# Patient Record
Sex: MALE | Race: WHITE | NOT HISPANIC OR LATINO | URBAN - METROPOLITAN AREA
[De-identification: names, ages, dates, MRNs, and addresses within clinical notes are randomized per-mention and may not be internally consistent; named-entity substitution may affect disease eponyms.]

---
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
## 2022-02-11 VITALS
DIASTOLIC BLOOD PRESSURE: 66 MMHG | WEIGHT: 306.8 LBS | WEIGHT: 315 LBS | HEIGHT: 72 IN | SYSTOLIC BLOOD PRESSURE: 122 MMHG | BODY MASS INDEX: 41.55 KG/M2 | HEIGHT: 72 IN | DIASTOLIC BLOOD PRESSURE: 60 MMHG | HEART RATE: 64 BPM | SYSTOLIC BLOOD PRESSURE: 124 MMHG | HEART RATE: 64 BPM | BODY MASS INDEX: 42.66 KG/M2 | TEMPERATURE: 97.6 F

## 2022-02-16 NOTE — PROGRESS NOTES
Patient:   DONNA CORTÉS            MRN: [id number]            FIN: [id number]               Age:   17 years     Sex:  Male     :  2000   Associated Diagnoses:   Moderate major depression   Author:   Darnell Sawant MD      Visit Information      Date of Service: 2018 03:35 pm  Performing Location: [facility]  Encounter#: [id number]      Primary Care Provider (PCP):  Darnell Sawant MD    NPI# [id number]      Referring Provider:  Darnell Sawant MD    NPI# [id number]      Chief Complaint   3/14/2018 3:40 PM CDT    Discuss depression/anxiety   Chief complaint and symptoms noted above confirmed with patient.      Interval History   Depression   The course is worsening.  Compliance problems: with exercise program.  The effect on daily activities is change in activity level, no change in eating habits and no change in sleeping patterns.  Associated symptoms characterized by loss of appetite, no suicidal thoughts.        Review of Systems   Constitutional:  Negative.    Respiratory:  Negative.    Cardiovascular:  Negative.    Gastrointestinal:  Negative.    Neurologic:  Negative.    Psychiatric:  Negative except as documented in history of present illness.       Health Status   Allergies:    Allergic Reactions (Selected)  No Known Medication Allergies   Medications:  (Selected)   Prescriptions  Prescribed  Wellbutrin  mg/12 hours oral tablet, extended release: See Instructions, Instructions: 1 tab(s) PO QD x 3 then BID, # 25 tab(s), 0 Refill(s), Type: Maintenance, Pharmacy: [facility] #[id number], 1 tab(s) PO QD x 3 then BID   Problem list:    All Problems  Morbid obesity / SNOMED CT 563903359 / Probable      Histories   Past Medical History:    No active or resolved past medical history items have been selected or recorded.   Family History:    No family history items have been selected or recorded.   Procedure history:    No active procedure history items  have been selected or recorded.   Social History:             No active social history items have been recorded.      Physical Examination   Vital Signs   3/14/2018 3:40 PM CDT Temperature Tympanic 97.6 DegF  LOW    Peripheral Pulse Rate 64 bpm    Pulse Site Radial artery    HR Method Manual    Systolic Blood Pressure 124 mmHg    Diastolic Blood Pressure 66 mmHg    Mean Arterial Pressure 85 mmHg    BP Site Left arm    BP Method Manual      Measurements from flowsheet : Measurements   3/14/2018 3:40 PM CDT Height Measured - Standard 71.75 in    Weight Measured - Standard 319.4 lb    BSA 2.71 m2    Body Mass Index 43.62 kg/m2    Body Mass Index Percentile 99.82      General:  Alert and oriented, No acute distress.    Eye:  Normal conjunctiva.    HENT:  Normocephalic.    Neck:  Supple, Non-tender.    Respiratory:  Lungs are clear to auscultation.    Cardiovascular:  Normal rate, Regular rhythm, No murmur.    Gastrointestinal:  Soft, Non-tender, Non-distended.    Genitourinary:  No costovertebral angle tenderness.    Musculoskeletal:  Normal range of motion, Normal strength, No tenderness.    Integumentary:  Warm, Dry, Pink.    Neurologic:  Alert, Oriented, Normal sensory.    Psychiatric:  Cooperative, Appropriate mood & affect, Normal judgment, Non-suicidal.         Mood and affect: Depressed.         Behavior: Congruent.       Impression and Plan   Diagnosis     Moderate major depression (PXB40-NH F32.1).     Course:  Not improving.    Orders     Orders   Requests (Return to Office):  Return to Clinic (Request) (Order): Return in 1 week.     Orders (Selected)   Prescriptions  Prescribed  Wellbutrin  mg/12 hours oral tablet, extended release: See Instructions, Instructions: 1 tab(s) PO QD x 3 then BID, # 25 tab(s), 0 Refill(s), Type: Maintenance, Pharmacy: [email or web address] #[id number], 1 tab(s) PO QD x 3 then BID.     Reviewed risk of suicide with patient and father.  Also gave list of therapists to try as well..         Professional Services   Counseling Summary:  This was a 30 minute visit with greater than 50% of that time spent counseling the patient, and coordination of care..    Counseling included differential diagnoses, treatment options, risks and benefits, lifestyle changes, current condition, medications, and dose adjustments.    The patient was interactive, attentive, asked questions, and verbalized understanding.    Family present included father.

## 2022-02-16 NOTE — PROGRESS NOTES
Patient:   DONNA CORTÉS            MRN: [id number]            FIN: [id number]               Age:   17 years     Sex:  Male     :  2000   Associated Diagnoses:   Moderate major depression   Author:   Darnell Sawant MD      Visit Information      Date of Service: 2018 03:34 pm  Performing Location: [facility]  Encounter#: [id number]      Primary Care Provider (PCP):  Darnell Sawant MD    NPI# [id number]      Referring Provider:  Darnell Sawant MD    NPI# [id number]      Chief Complaint   2018 3:45 PM CDT    Follow up anxiety/depression     Chief complaint and symptoms noted above confirmed with patient.      Interval History   Depression   The course is worsening.  Compliance problems: with exercise program.  The effect on daily activities is change in activity level, no change in eating habits and no change in sleeping patterns.  Associated symptoms characterized by loss of appetite, no suicidal thoughts.        Review of Systems   Constitutional:  Negative.    Respiratory:  Negative.    Cardiovascular:  Negative.    Gastrointestinal:  Negative.    Neurologic:  Negative.    Psychiatric:  Negative except as documented in history of present illness.       Health Status   Allergies:    Allergic Reactions (Selected)  No Known Medication Allergies   Medications:  (Selected)   Prescriptions  Prescribed  Wellbutrin  mg/12 hours oral tablet, extended release: 1 tab(s) ( 150 mg ), po, bid, # 60 tab(s), 0 Refill(s), Type: Maintenance, Pharmacy: [facility] #[id number], 1 tab(s) po bid,x30 day(s)   Problem list:    All Problems  Morbid obesity / SNOMED CT 419930744 / Probable      Histories   Past Medical History:    No active or resolved past medical history items have been selected or recorded.   Family History:    No family history items have been selected or recorded.   Procedure history:    No active procedure history items have been selected or  recorded.   Social History:             No active social history items have been recorded.      Physical Examination   Vital Signs   4/27/2018 3:45 PM CDT Peripheral Pulse Rate 64 bpm    Pulse Site Radial artery    HR Method Manual    Systolic Blood Pressure 122 mmHg    Diastolic Blood Pressure 60 mmHg    Mean Arterial Pressure 81 mmHg    BP Site Left arm    BP Method Manual      Measurements from flowsheet : Measurements   4/27/2018 3:45 PM CDT Height Measured - Standard 72 in    Weight Measured - Standard 306.8 lb    BSA 2.66 m2    Body Mass Index 41.61 kg/m2    Body Mass Index Percentile 99.75      General:  Alert and oriented, No acute distress.    Eye:  Normal conjunctiva.    HENT:  Normocephalic.    Neck:  Supple, Non-tender.    Respiratory:  Lungs are clear to auscultation.    Cardiovascular:  Normal rate, Regular rhythm, No murmur.    Gastrointestinal:  Soft, Non-tender, Non-distended.    Genitourinary:  No costovertebral angle tenderness.    Musculoskeletal:  Normal range of motion, Normal strength, No tenderness.    Integumentary:  Warm, Dry, Pink.    Neurologic:  Alert, Oriented, Normal sensory.    Psychiatric:  Cooperative, Appropriate mood & affect, Normal judgment, Non-suicidal.         Mood and affect: Depressed.         Behavior: Congruent.       Impression and Plan   Diagnosis     Moderate major depression ([id number] F32.1).     Course:  Not improving.    Orders     Orders (Selected)   Prescriptions  Prescribed  Wellbutrin  mg/12 hours oral tablet, extended release: 1 tab(s) ( 150 mg ), po, bid, # 60 tab(s), 0 Refill(s), Type: Maintenance, Pharmacy: [facility] #[id number], 1 tab(s) po bid,x30 day(s).        Professional Services   Counseling Summary:     Counseling included differential diagnoses, treatment options, risks and benefits, lifestyle changes, current condition, medications, and dose adjustments.    The patient was interactive, attentive, asked questions, and verbalized understanding.     Family present included father.

## 2024-07-22 ENCOUNTER — ANCILLARY PROCEDURE (OUTPATIENT)
Dept: GENERAL RADIOLOGY | Facility: CLINIC | Age: 24
End: 2024-07-22
Attending: INTERNAL MEDICINE
Payer: COMMERCIAL

## 2024-07-22 ENCOUNTER — OFFICE VISIT (OUTPATIENT)
Dept: FAMILY MEDICINE | Facility: CLINIC | Age: 24
End: 2024-07-22
Payer: COMMERCIAL

## 2024-07-22 VITALS
OXYGEN SATURATION: 96 % | WEIGHT: 289 LBS | BODY MASS INDEX: 39.14 KG/M2 | TEMPERATURE: 98.3 F | HEIGHT: 72 IN | RESPIRATION RATE: 16 BRPM | SYSTOLIC BLOOD PRESSURE: 157 MMHG | DIASTOLIC BLOOD PRESSURE: 85 MMHG | HEART RATE: 66 BPM

## 2024-07-22 DIAGNOSIS — T14.90XA TRAUMA: ICD-10-CM

## 2024-07-22 DIAGNOSIS — R10.32 LLQ ABDOMINAL PAIN: ICD-10-CM

## 2024-07-22 DIAGNOSIS — R10.32 LLQ ABDOMINAL PAIN: Primary | ICD-10-CM

## 2024-07-22 DIAGNOSIS — R03.0 ELEVATED BLOOD PRESSURE READING: ICD-10-CM

## 2024-07-22 DIAGNOSIS — D64.9 MILD ANEMIA: Primary | ICD-10-CM

## 2024-07-22 LAB
ALBUMIN SERPL BCG-MCNC: 4.4 G/DL (ref 3.5–5.2)
ALBUMIN UR-MCNC: 30 MG/DL
ALP SERPL-CCNC: 80 U/L (ref 40–150)
ALT SERPL W P-5'-P-CCNC: 36 U/L (ref 0–70)
ANION GAP SERPL CALCULATED.3IONS-SCNC: 10 MMOL/L (ref 7–15)
APPEARANCE UR: CLEAR
AST SERPL W P-5'-P-CCNC: 47 U/L (ref 0–45)
BACTERIA #/AREA URNS HPF: ABNORMAL /HPF
BASOPHILS # BLD AUTO: 0 10E3/UL (ref 0–0.2)
BASOPHILS NFR BLD AUTO: 1 %
BILIRUB SERPL-MCNC: 0.4 MG/DL
BILIRUB UR QL STRIP: NEGATIVE
BUN SERPL-MCNC: 14.7 MG/DL (ref 6–20)
CALCIUM SERPL-MCNC: 9.6 MG/DL (ref 8.8–10.4)
CHLORIDE SERPL-SCNC: 104 MMOL/L (ref 98–107)
COLOR UR AUTO: YELLOW
CREAT SERPL-MCNC: 1.09 MG/DL (ref 0.67–1.17)
CRP SERPL-MCNC: <3 MG/L
EGFRCR SERPLBLD CKD-EPI 2021: >90 ML/MIN/1.73M2
EOSINOPHIL # BLD AUTO: 0.1 10E3/UL (ref 0–0.7)
EOSINOPHIL NFR BLD AUTO: 1 %
ERYTHROCYTE [DISTWIDTH] IN BLOOD BY AUTOMATED COUNT: 12.7 % (ref 10–15)
GLUCOSE SERPL-MCNC: 99 MG/DL (ref 70–99)
GLUCOSE UR STRIP-MCNC: NEGATIVE MG/DL
HCO3 SERPL-SCNC: 22 MMOL/L (ref 22–29)
HCT VFR BLD AUTO: 36.8 % (ref 40–53)
HGB BLD-MCNC: 12.7 G/DL (ref 13.3–17.7)
HGB UR QL STRIP: ABNORMAL
IMM GRANULOCYTES # BLD: 0 10E3/UL
IMM GRANULOCYTES NFR BLD: 0 %
KETONES UR STRIP-MCNC: NEGATIVE MG/DL
LEUKOCYTE ESTERASE UR QL STRIP: NEGATIVE
LIPASE SERPL-CCNC: 34 U/L (ref 13–60)
LYMPHOCYTES # BLD AUTO: 1.7 10E3/UL (ref 0.8–5.3)
LYMPHOCYTES NFR BLD AUTO: 23 %
MCH RBC QN AUTO: 31.1 PG (ref 26.5–33)
MCHC RBC AUTO-ENTMCNC: 34.5 G/DL (ref 31.5–36.5)
MCV RBC AUTO: 90 FL (ref 78–100)
MONOCYTES # BLD AUTO: 0.7 10E3/UL (ref 0–1.3)
MONOCYTES NFR BLD AUTO: 10 %
NEUTROPHILS # BLD AUTO: 4.6 10E3/UL (ref 1.6–8.3)
NEUTROPHILS NFR BLD AUTO: 65 %
NITRATE UR QL: NEGATIVE
PH UR STRIP: 6.5 [PH] (ref 5–7)
PLATELET # BLD AUTO: 393 10E3/UL (ref 150–450)
POTASSIUM SERPL-SCNC: 4.2 MMOL/L (ref 3.4–5.3)
PROT SERPL-MCNC: 7.7 G/DL (ref 6.4–8.3)
RBC # BLD AUTO: 4.09 10E6/UL (ref 4.4–5.9)
RBC #/AREA URNS AUTO: ABNORMAL /HPF
SODIUM SERPL-SCNC: 136 MMOL/L (ref 135–145)
SP GR UR STRIP: 1.02 (ref 1–1.03)
SQUAMOUS #/AREA URNS AUTO: ABNORMAL /LPF
UROBILINOGEN UR STRIP-ACNC: 0.2 E.U./DL
WBC # BLD AUTO: 7.1 10E3/UL (ref 4–11)
WBC #/AREA URNS AUTO: ABNORMAL /HPF

## 2024-07-22 PROCEDURE — 82550 ASSAY OF CK (CPK): CPT | Performed by: INTERNAL MEDICINE

## 2024-07-22 PROCEDURE — 80053 COMPREHEN METABOLIC PANEL: CPT | Performed by: INTERNAL MEDICINE

## 2024-07-22 PROCEDURE — 36415 COLL VENOUS BLD VENIPUNCTURE: CPT | Performed by: INTERNAL MEDICINE

## 2024-07-22 PROCEDURE — 82728 ASSAY OF FERRITIN: CPT | Performed by: INTERNAL MEDICINE

## 2024-07-22 PROCEDURE — 83690 ASSAY OF LIPASE: CPT | Performed by: INTERNAL MEDICINE

## 2024-07-22 PROCEDURE — 74019 RADEX ABDOMEN 2 VIEWS: CPT | Mod: TC | Performed by: RADIOLOGY

## 2024-07-22 PROCEDURE — 99204 OFFICE O/P NEW MOD 45 MIN: CPT | Performed by: INTERNAL MEDICINE

## 2024-07-22 PROCEDURE — 85025 COMPLETE CBC W/AUTO DIFF WBC: CPT | Performed by: INTERNAL MEDICINE

## 2024-07-22 PROCEDURE — 86140 C-REACTIVE PROTEIN: CPT | Performed by: INTERNAL MEDICINE

## 2024-07-22 PROCEDURE — 81001 URINALYSIS AUTO W/SCOPE: CPT | Performed by: INTERNAL MEDICINE

## 2024-07-22 ASSESSMENT — PAIN SCALES - GENERAL: PAINLEVEL: NO PAIN (0)
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
## 2024-07-22 NOTE — LETTER
July 23, 2024      Harvey Allen   [facility] [id number]        Dear ,    We are writing to inform you of your test results.    Rojelio Gabriel,      I added ferritin which is iron stores in the blood is normal, you do not have iron deficiency.    Dr Scott    Resulted Orders   Ferritin   Result Value Ref Range    Ferritin 192 31 - 409 ng/mL

## 2024-07-23 ENCOUNTER — TELEPHONE (OUTPATIENT)
Dept: FAMILY MEDICINE | Facility: CLINIC | Age: 24
End: 2024-07-23
Payer: COMMERCIAL

## 2024-07-23 LAB
CK SERPL-CCNC: 851 U/L (ref 39–308)
FERRITIN SERPL-MCNC: 192 NG/ML (ref 31–409)

## 2024-07-23 NOTE — TELEPHONE ENCOUNTER
Patient Contact     Attempt # 1     Was call answered?  No.  Left message on voicemail with information to call triage back.        Elina Scott MD  7/23/2024  8:08 AM CDT Back to Top      [facility]-reviewed x-ray of the abdomen shows no obstruction; normal gas pattern.  No free air in the abdomen reassuring suggest no perforation of the bowel.  No abnormal soft tissue masses or calcium deposit.  No bony abnormalities, otherwise unremarkable x-ray.  Dr Sonia Diehl Do, RN  [facility]

## 2024-07-23 NOTE — RESULT ENCOUNTER NOTE
Rojelio Gabriel,  reviewed some of your labs,    Urine test shows some protein,  small blood,  but no red blood cells-please keep well-hydrated.    Chemistry shows normal kidney function test , normal electrolytes, slight increase of AST liver enzyme which could be secondary to fatty liver.    CBC shows normal white blood cell count reassuring slight anemia with hemoglobin of 12.7 hematocrit 36.8, platelet count is normal with normal differential.    Lipase which is a pancreatic enzyme is normal.    Inflammatory marker called CRP is negative which is reassuring,    Rest of labs and x-ray result is pending.  Please be reassured with your labs.    Please as discussed keep monitoring your blood pressure as outpatient and schedule follow-up to discuss, as blood pressure was elevated in the clinic.    Regards,  Dr. Scott

## 2024-07-23 NOTE — TELEPHONE ENCOUNTER
[facility]ki     S-(situation):   Patient returning call.    B-(background):   Patient was seen 7/22/2024 DR. Scott    A-(assessment):   No nursing note in chart.  Reviewed with patient provider note: with lab results.    R-(recommendations):     Please contact patient and follow up with message left on machine to call Nurse.      Manda RN, BSN  [place], [place]

## 2024-07-23 NOTE — TELEPHONE ENCOUNTER
Duplicate encounter, see other telephone encounter 7/23/24.      Fazal Clark RN  [facility]

## 2024-07-23 NOTE — RESULT ENCOUNTER NOTE
Rojelio Gabriel,   I added ferritin which is iron stores in the blood is normal, you do not have iron deficiency.  Dr Scott

## 2024-07-23 NOTE — RESULT ENCOUNTER NOTE
Rojelio Gabriel-reviewed x-ray of the abdomen shows no obstruction; normal gas pattern.  No free air in the abdomen reassuring suggest no perforation of the bowel.  No abnormal soft tissue masses or calcium deposit.  No bony abnormalities, otherwise unremarkable x-ray.  Dr Scott
Not applicable

## 2024-07-24 ENCOUNTER — TELEPHONE (OUTPATIENT)
Dept: FAMILY MEDICINE | Facility: CLINIC | Age: 24
End: 2024-07-24
Payer: COMMERCIAL

## 2024-07-24 NOTE — RESULT ENCOUNTER NOTE
Please notify patient muscle enzyme called CK is elevated at 851 ; it is very important to push more fluids/ keep well-hydrated as elevated muscle enzymes can cause kidney injury as well.  Would recommend repeating CK muscle enzyme in next 72 hours.    Patient can also schedule follow-up which I recommend to.  Dr Scott

## 2024-07-24 NOTE — TELEPHONE ENCOUNTER
Patient agreeable to plan. Will schedule lab.     SHAYLEE Mendiola  [facility] Triage

## 2024-07-24 NOTE — TELEPHONE ENCOUNTER
Patient Contact    Attempt # 2    Was call answered?  No.  Left message on voicemail with information to call triage back.    Renetta JUNE  [facility]

## 2024-07-24 NOTE — TELEPHONE ENCOUNTER
----- Message from Elina Scott sent at 7/23/2024  8:14 PM CDT -----  Please notify patient muscle enzyme called CK is elevated at 851 ; it is very important to push more fluids/ keep well-hydrated as elevated muscle enzymes can cause kidney injury as well.  Would recommend repeating CK muscle enzyme in next 72 hours.    Patient can also schedule follow-up which I recommend to.  Dr Scott

## 2024-07-25 NOTE — TELEPHONE ENCOUNTER
Pt informed of message below. Future follow up appt was scheduled per providers recommendations on 07/22 lab note.

## 2024-07-26 ENCOUNTER — APPOINTMENT (OUTPATIENT)
Dept: LAB | Facility: CLINIC | Age: 24
End: 2024-07-26
Payer: COMMERCIAL

## 2024-07-30 ENCOUNTER — TELEPHONE (OUTPATIENT)
Dept: FAMILY MEDICINE | Facility: CLINIC | Age: 24
End: 2024-07-30
Payer: COMMERCIAL

## 2024-07-30 NOTE — TELEPHONE ENCOUNTER
----- Message from Elina Scott sent at 7/30/2024  4:47 PM CDT -----  Yes, please the results need to be relayed to the patient as CK was and add to the labs and was elevated.  Please check on the clinical status of the patient.  Please get an update on his clinical status.  Thank you for follow-up    Please notify patient muscle enzyme called CK is elevated at 851 ; it is very important to push more fluids/ keep well-hydrated as elevated muscle enzymes can cause kidney injury as well.  Would recommend repeating CK muscle enzyme in next 72 hours.     Patient can also schedule follow-up which I recommend to.  Dr Scott

## 2024-07-30 NOTE — RESULT ENCOUNTER NOTE
Yes, please the results need to be relayed to the patient as CK was and add to the labs and was elevated.  Please check on the clinical status of the patient.  Please get an update on his clinical status.  Thank you for follow-up    Please notify patient muscle enzyme called CK is elevated at 851 ; it is very important to push more fluids/ keep well-hydrated as elevated muscle enzymes can cause kidney injury as well.  Would recommend repeating CK muscle enzyme in next 72 hours.    Patient can also schedule follow-up which I recommend to.  Dr Scott

## 2024-07-30 NOTE — TELEPHONE ENCOUNTER
Triage outreach    Attempt number 1    Left message to call back at [phone]. Spoke to mom, told her to have her call back.     SHAYLEE Mendiola  [facility] Triage